# Patient Record
Sex: FEMALE | Race: WHITE | Employment: OTHER | URBAN - METROPOLITAN AREA
[De-identification: names, ages, dates, MRNs, and addresses within clinical notes are randomized per-mention and may not be internally consistent; named-entity substitution may affect disease eponyms.]

---

## 2020-02-06 ENCOUNTER — HOSPITAL ENCOUNTER (OUTPATIENT)
Dept: CT IMAGING | Age: 45
Discharge: HOME | End: 2020-02-08
Payer: COMMERCIAL

## 2020-02-06 ENCOUNTER — HOSPITAL ENCOUNTER (OUTPATIENT)
Dept: ULTRASOUND IMAGING | Age: 45
Discharge: HOME | End: 2020-02-08
Payer: COMMERCIAL

## 2020-02-06 VITALS
DIASTOLIC BLOOD PRESSURE: 93 MMHG | SYSTOLIC BLOOD PRESSURE: 141 MMHG | WEIGHT: 165 LBS | HEART RATE: 75 BPM | HEIGHT: 64 IN | BODY MASS INDEX: 28.17 KG/M2

## 2020-02-06 PROCEDURE — 6360000004 HC RX CONTRAST MEDICATION: Performed by: INTERNAL MEDICINE

## 2020-02-06 PROCEDURE — 71270 CT THORAX DX C-/C+: CPT

## 2020-02-06 PROCEDURE — 93978 VASCULAR STUDY: CPT

## 2020-02-06 RX ADMIN — IOPAMIDOL 100 ML: 755 INJECTION, SOLUTION INTRAVENOUS at 07:50

## 2021-01-28 ENCOUNTER — NURSE ONLY (OUTPATIENT)
Dept: PRIMARY CARE CLINIC | Age: 46
End: 2021-01-28

## 2021-01-28 DIAGNOSIS — Z01.818 ENCOUNTER FOR PREADMISSION TESTING: ICD-10-CM

## 2021-01-28 DIAGNOSIS — Z01.818 ENCOUNTER FOR PREADMISSION TESTING: Primary | ICD-10-CM

## 2021-01-29 LAB
SARS-COV-2: NOT DETECTED
SOURCE: NORMAL

## 2021-02-02 ENCOUNTER — ANESTHESIA EVENT (OUTPATIENT)
Dept: OPERATING ROOM | Age: 46
End: 2021-02-02
Payer: MEDICARE

## 2021-02-03 ENCOUNTER — ANESTHESIA (OUTPATIENT)
Dept: OPERATING ROOM | Age: 46
End: 2021-02-03
Payer: MEDICARE

## 2021-02-03 ENCOUNTER — HOSPITAL ENCOUNTER (OUTPATIENT)
Age: 46
Setting detail: OUTPATIENT SURGERY
Discharge: HOME | End: 2021-02-03
Attending: ORTHOPAEDIC SURGERY | Admitting: ORTHOPAEDIC SURGERY
Payer: MEDICARE

## 2021-02-03 VITALS
HEART RATE: 78 BPM | RESPIRATION RATE: 20 BRPM | DIASTOLIC BLOOD PRESSURE: 88 MMHG | TEMPERATURE: 97.9 F | BODY MASS INDEX: 27.31 KG/M2 | SYSTOLIC BLOOD PRESSURE: 137 MMHG | HEIGHT: 64 IN | OXYGEN SATURATION: 94 % | WEIGHT: 160 LBS

## 2021-02-03 VITALS — OXYGEN SATURATION: 97 % | SYSTOLIC BLOOD PRESSURE: 176 MMHG | DIASTOLIC BLOOD PRESSURE: 105 MMHG

## 2021-02-03 PROCEDURE — 3700000000 HC ANESTHESIA ATTENDED CARE: Performed by: ORTHOPAEDIC SURGERY

## 2021-02-03 PROCEDURE — 2580000003 HC RX 258: Performed by: ANESTHESIOLOGY

## 2021-02-03 PROCEDURE — 3600000012 HC SURGERY LEVEL 2 ADDTL 15MIN: Performed by: ORTHOPAEDIC SURGERY

## 2021-02-03 PROCEDURE — 2580000003 HC RX 258: Performed by: ORTHOPAEDIC SURGERY

## 2021-02-03 PROCEDURE — 2709999900 HC NON-CHARGEABLE SUPPLY: Performed by: ORTHOPAEDIC SURGERY

## 2021-02-03 PROCEDURE — 6360000002 HC RX W HCPCS: Performed by: ORTHOPAEDIC SURGERY

## 2021-02-03 PROCEDURE — 3700000001 HC ADD 15 MINUTES (ANESTHESIA): Performed by: ORTHOPAEDIC SURGERY

## 2021-02-03 PROCEDURE — 3600000002 HC SURGERY LEVEL 2 BASE: Performed by: ORTHOPAEDIC SURGERY

## 2021-02-03 PROCEDURE — 88304 TISSUE EXAM BY PATHOLOGIST: CPT

## 2021-02-03 RX ORDER — FENTANYL CITRATE 50 UG/ML
50 INJECTION, SOLUTION INTRAMUSCULAR; INTRAVENOUS EVERY 10 MIN PRN
Status: DISCONTINUED | OUTPATIENT
Start: 2021-02-03 | End: 2021-02-03 | Stop reason: HOSPADM

## 2021-02-03 RX ORDER — DULOXETIN HYDROCHLORIDE 30 MG/1
30 CAPSULE, DELAYED RELEASE ORAL 2 TIMES DAILY
COMMUNITY
Start: 2014-10-31

## 2021-02-03 RX ORDER — CEFAZOLIN SODIUM 2 G/50ML
2000 SOLUTION INTRAVENOUS
Status: COMPLETED | OUTPATIENT
Start: 2021-02-03 | End: 2021-02-03

## 2021-02-03 RX ORDER — MAGNESIUM OXIDE 400 MG/1
400 TABLET ORAL
COMMUNITY

## 2021-02-03 RX ORDER — SODIUM CHLORIDE 0.9 % (FLUSH) 0.9 %
10 SYRINGE (ML) INJECTION EVERY 12 HOURS SCHEDULED
Status: DISCONTINUED | OUTPATIENT
Start: 2021-02-03 | End: 2021-02-03 | Stop reason: HOSPADM

## 2021-02-03 RX ORDER — UBIDECARENONE 100 MG
CAPSULE ORAL
COMMUNITY

## 2021-02-03 RX ORDER — NADOLOL 20 MG/1
10 TABLET ORAL DAILY
COMMUNITY

## 2021-02-03 RX ORDER — LIDOCAINE HYDROCHLORIDE 10 MG/ML
1 INJECTION, SOLUTION EPIDURAL; INFILTRATION; INTRACAUDAL; PERINEURAL
Status: DISCONTINUED | OUTPATIENT
Start: 2021-02-03 | End: 2021-02-03 | Stop reason: HOSPADM

## 2021-02-03 RX ORDER — METOCLOPRAMIDE HYDROCHLORIDE 5 MG/ML
10 INJECTION INTRAMUSCULAR; INTRAVENOUS
Status: DISCONTINUED | OUTPATIENT
Start: 2021-02-03 | End: 2021-02-03 | Stop reason: HOSPADM

## 2021-02-03 RX ORDER — SODIUM CHLORIDE 0.9 % (FLUSH) 0.9 %
10 SYRINGE (ML) INJECTION PRN
Status: DISCONTINUED | OUTPATIENT
Start: 2021-02-03 | End: 2021-02-03 | Stop reason: HOSPADM

## 2021-02-03 RX ORDER — ONDANSETRON 2 MG/ML
4 INJECTION INTRAMUSCULAR; INTRAVENOUS
Status: DISCONTINUED | OUTPATIENT
Start: 2021-02-03 | End: 2021-02-03 | Stop reason: HOSPADM

## 2021-02-03 RX ORDER — MAGNESIUM HYDROXIDE 1200 MG/15ML
LIQUID ORAL CONTINUOUS PRN
Status: COMPLETED | OUTPATIENT
Start: 2021-02-03 | End: 2021-02-03

## 2021-02-03 RX ORDER — MEPERIDINE HYDROCHLORIDE 25 MG/ML
12.5 INJECTION INTRAMUSCULAR; INTRAVENOUS; SUBCUTANEOUS EVERY 5 MIN PRN
Status: DISCONTINUED | OUTPATIENT
Start: 2021-02-03 | End: 2021-02-03 | Stop reason: HOSPADM

## 2021-02-03 RX ORDER — FLUDROCORTISONE ACETATE 0.1 MG/1
TABLET ORAL
COMMUNITY
Start: 2020-06-03

## 2021-02-03 RX ORDER — SODIUM CHLORIDE, SODIUM LACTATE, POTASSIUM CHLORIDE, CALCIUM CHLORIDE 600; 310; 30; 20 MG/100ML; MG/100ML; MG/100ML; MG/100ML
INJECTION, SOLUTION INTRAVENOUS CONTINUOUS
Status: DISCONTINUED | OUTPATIENT
Start: 2021-02-03 | End: 2021-02-03 | Stop reason: HOSPADM

## 2021-02-03 RX ORDER — DIPHENHYDRAMINE HYDROCHLORIDE 50 MG/ML
12.5 INJECTION INTRAMUSCULAR; INTRAVENOUS
Status: DISCONTINUED | OUTPATIENT
Start: 2021-02-03 | End: 2021-02-03 | Stop reason: HOSPADM

## 2021-02-03 RX ADMIN — SODIUM CHLORIDE, POTASSIUM CHLORIDE, SODIUM LACTATE AND CALCIUM CHLORIDE: 600; 310; 30; 20 INJECTION, SOLUTION INTRAVENOUS at 07:46

## 2021-02-03 RX ADMIN — CEFAZOLIN SODIUM 2 G: 2 SOLUTION INTRAVENOUS at 08:00

## 2021-02-03 ASSESSMENT — PULMONARY FUNCTION TESTS
PIF_VALUE: 0

## 2021-02-03 NOTE — PROGRESS NOTES
Patient ID:  Anton Lackey  [id number]  54 y.o.  1975  BOVIE PAD SITE CLEAR AND INTACT PRE AND POST OP. TAKEN TO PACU,   ATTACHED TO MONITOR AND REPORT GIVEN TO RN.   VSS DRSG DRY AND INTACT  GLASSES IN LABELED BAG ON PATIENT CART  TOURNI-COT ON LEFT LONG FINGER AT [address]  TOURNI-COT OFF LEFT FINGER AT 8875        Electronically signed by Delmi Álvarez RN

## 2021-02-03 NOTE — ANESTHESIA POSTPROCEDURE EVALUATION
Department of Anesthesiology  Postprocedure Note    Patient: Kadie Irby  MRN: [id number]  YOB: 1975  Date of evaluation: 2/3/2021  Time:  8:32 AM     Procedure Summary     Date: 02/03/21 Room / Location: [address]    Anesthesia Start: 0800 Anesthesia Stop:     Procedure: LEFT REMOVAL LEFT LONG FINGER MUCOUS CYST (Left Fingers) Diagnosis: (LEFT LONG FINGER MUCOUS CYST)    Surgeons: Kev Lo DO Responsible Provider: Brittaney Wheat MD    Anesthesia Type: MAC ASA Status: 2          Anesthesia Type: MAC    Jaylen Phase I:      Jaylen Phase II:      Last vitals: Reviewed and per EMR flowsheets.        Anesthesia Post Evaluation    Patient location during evaluation: bedside  Patient participation: complete - patient participated  Level of consciousness: awake and alert  Pain score: 0  Nausea & Vomiting: no vomiting and no nausea  Complications: no  Cardiovascular status: hemodynamically stable  Respiratory status: acceptable and room air  Hydration status: stable

## 2021-02-03 NOTE — OP NOTE
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  Left long finger removal bone spurring dorsal aspect of middle phalanx at distal interphalangeal joint. Procedure performed: Same    Surgeon: Oliva Shah D.O. Assistant: None    Anesthesia: Digital block monitored by the anesthesia team    Estimated blood loss: Less than 5 cc    Drains: None    Tourniquet: Turnicot for approximately 10 minutes    Specimens: The excised mass was sent for pathology. To inspection it was consistent with involuted mucous cyst.    Implants: None    Indications: The patient presented to the office with history of left long finger mass with spontaneous rupture. She described the expression of a thick gelatinous material with repeated ruptures. Treatment options were discussed. Recommendations were made for excision of the mass with debridement of distal interphalangeal joint osteoarthritis bone spurring. After full discussion regarding risks benefits and alternatives the patient elected to proceed forth with surgery. Informed consent was signed and placed in the chart. Complications: None noted at the time of surgery     Description of operation: The patient was taken to the operative suite and placed in the supine position on the operating table. A timeout was performed and the left long finger was confirmed to be the operative site. She was carefully positioned on the table in such a fashion as to pad all bony prominences and peripheral nerves. She was administered appropriate IV antibiotics. The Marcaine digital block was working well. She was prepped and draped in the normal sterile fashion.   A Y-shaped incision was marked over the dorsal aspect of the long finger with the longitudinal limb of the why oriented transversely over the distal interphalangeal joint. A turnicot was placed. The 15 blade was used to incise skin reflecting full-thickness subcutaneous flaps off of the extensor mechanism. The interface between the mass itself and the dermal plane was incised and the mass circumferentially dissected with the 15 blade. The mass was excised along with the dorsal radial aspect of the capsule taking care to avoid iatrogenic injury to the terminal extensor mechanism. This exposed hypertrophic bone spurring in the joint itself coming off of the dorsal radial aspect of the middle phalanx. The mass excised was sent for pathology. The rongeur was used to remove the hypertrophic bone spurring which likely gave rise to the mucous cyst.  Irrigation was performed. The skin flaps were contoured with the 15 blade and interrupted nylon stitches used to close the skin. The turnicot was relieved, hemostasis confirmed, and a bulky soft dressing placed. The patient was allowed to head to recovery in stable condition. Overall she tolerated the procedure well.      Disposition: Stable to PACU          Specimens:   ID Type Source Tests Collected by Time Destination   A : LEFT LONG FINGER MUCOUS CYST Tissue Finger SURGICAL PATHOLOGY Madison San DO 2/3/2021 8505          Electronically signed by Blayne Chicas DO on 2/3/2021 at 9:03 AM

## 2022-12-08 LAB
ANION GAP SERPL CALCULATED.3IONS-SCNC: 13 MMOL/L (ref 10–20)
BICARBONATE: 26 MMOL/L (ref 21–32)
CALCIUM SERPL-MCNC: 9.4 MG/DL (ref 8.6–10.3)
CHLORIDE BLD-SCNC: 108 MMOL/L (ref 98–107)
CREAT SERPL-MCNC: 0.73 MG/DL (ref 0.5–1)
EGFR FEMALE: >90 ML/MIN/1.73M2
GLUCOSE: 123 MG/DL (ref 74–99)
MAGNESIUM: 2 MG/DL (ref 1.6–2.4)
POTASSIUM SERPL-SCNC: 3.9 MMOL/L (ref 3.5–5.3)
SODIUM BLD-SCNC: 143 MMOL/L (ref 136–145)
UREA NITROGEN: 16 MG/DL (ref 6–23)

## 2023-06-26 LAB
ALANINE AMINOTRANSFERASE (SGPT) (U/L) IN SER/PLAS: 13 U/L (ref 7–45)
ALBUMIN (G/DL) IN SER/PLAS: 4.8 G/DL (ref 3.4–5)
ALKALINE PHOSPHATASE (U/L) IN SER/PLAS: 36 U/L (ref 33–110)
ANION GAP IN SER/PLAS: 12 MMOL/L (ref 10–20)
ASPARTATE AMINOTRANSFERASE (SGOT) (U/L) IN SER/PLAS: 22 U/L (ref 9–39)
BASOPHILS (10*3/UL) IN BLOOD BY AUTOMATED COUNT: 0.05 X10E9/L (ref 0–0.1)
BASOPHILS/100 LEUKOCYTES IN BLOOD BY AUTOMATED COUNT: 0.7 % (ref 0–2)
BILIRUBIN TOTAL (MG/DL) IN SER/PLAS: 0.6 MG/DL (ref 0–1.2)
C REACTIVE PROTEIN (MG/L) IN SER/PLAS: 0.2 MG/DL
CALCIUM (MG/DL) IN SER/PLAS: 9.8 MG/DL (ref 8.6–10.3)
CARBON DIOXIDE, TOTAL (MMOL/L) IN SER/PLAS: 24 MMOL/L (ref 21–32)
CHLORIDE (MMOL/L) IN SER/PLAS: 104 MMOL/L (ref 98–107)
CREATININE (MG/DL) IN SER/PLAS: 0.74 MG/DL (ref 0.5–1.05)
EOSINOPHILS (10*3/UL) IN BLOOD BY AUTOMATED COUNT: 0.13 X10E9/L (ref 0–0.7)
EOSINOPHILS/100 LEUKOCYTES IN BLOOD BY AUTOMATED COUNT: 1.8 % (ref 0–6)
ERYTHROCYTE DISTRIBUTION WIDTH (RATIO) BY AUTOMATED COUNT: 14 % (ref 11.5–14.5)
ERYTHROCYTE MEAN CORPUSCULAR HEMOGLOBIN CONCENTRATION (G/DL) BY AUTOMATED: 33.9 G/DL (ref 32–36)
ERYTHROCYTE MEAN CORPUSCULAR VOLUME (FL) BY AUTOMATED COUNT: 87 FL (ref 80–100)
ERYTHROCYTES (10*6/UL) IN BLOOD BY AUTOMATED COUNT: 4.26 X10E12/L (ref 4–5.2)
GFR FEMALE: >90 ML/MIN/1.73M2
GLUCOSE (MG/DL) IN SER/PLAS: 85 MG/DL (ref 74–99)
HEMATOCRIT (%) IN BLOOD BY AUTOMATED COUNT: 37.2 % (ref 36–46)
HEMOGLOBIN (G/DL) IN BLOOD: 12.6 G/DL (ref 12–16)
IMMATURE GRANULOCYTES/100 LEUKOCYTES IN BLOOD BY AUTOMATED COUNT: 0.5 % (ref 0–0.9)
LEUKOCYTES (10*3/UL) IN BLOOD BY AUTOMATED COUNT: 7.3 X10E9/L (ref 4.4–11.3)
LYMPHOCYTES (10*3/UL) IN BLOOD BY AUTOMATED COUNT: 2.38 X10E9/L (ref 1.2–4.8)
LYMPHOCYTES/100 LEUKOCYTES IN BLOOD BY AUTOMATED COUNT: 32.5 % (ref 13–44)
MONOCYTES (10*3/UL) IN BLOOD BY AUTOMATED COUNT: 0.61 X10E9/L (ref 0.1–1)
MONOCYTES/100 LEUKOCYTES IN BLOOD BY AUTOMATED COUNT: 8.3 % (ref 2–10)
NEUTROPHILS (10*3/UL) IN BLOOD BY AUTOMATED COUNT: 4.11 X10E9/L (ref 1.2–7.7)
NEUTROPHILS/100 LEUKOCYTES IN BLOOD BY AUTOMATED COUNT: 56.2 % (ref 40–80)
PLATELETS (10*3/UL) IN BLOOD AUTOMATED COUNT: 305 X10E9/L (ref 150–450)
POTASSIUM (MMOL/L) IN SER/PLAS: 4.2 MMOL/L (ref 3.5–5.3)
PROTEIN TOTAL: 7.1 G/DL (ref 6.4–8.2)
SODIUM (MMOL/L) IN SER/PLAS: 136 MMOL/L (ref 136–145)
UREA NITROGEN (MG/DL) IN SER/PLAS: 15 MG/DL (ref 6–23)

## 2023-08-25 LAB
ALANINE AMINOTRANSFERASE (SGPT) (U/L) IN SER/PLAS: 11 U/L (ref 7–45)
ALBUMIN (G/DL) IN SER/PLAS: 4.7 G/DL (ref 3.4–5)
ALKALINE PHOSPHATASE (U/L) IN SER/PLAS: 41 U/L (ref 33–110)
ANION GAP IN SER/PLAS: 12 MMOL/L (ref 10–20)
ASPARTATE AMINOTRANSFERASE (SGOT) (U/L) IN SER/PLAS: 21 U/L (ref 9–39)
BASOPHILS (10*3/UL) IN BLOOD BY AUTOMATED COUNT: 0.09 X10E9/L (ref 0–0.1)
BASOPHILS/100 LEUKOCYTES IN BLOOD BY AUTOMATED COUNT: 0.9 % (ref 0–2)
BILIRUBIN TOTAL (MG/DL) IN SER/PLAS: 0.6 MG/DL (ref 0–1.2)
C REACTIVE PROTEIN (MG/L) IN SER/PLAS: 0.24 MG/DL
CALCIUM (MG/DL) IN SER/PLAS: 9.6 MG/DL (ref 8.6–10.3)
CARBON DIOXIDE, TOTAL (MMOL/L) IN SER/PLAS: 27 MMOL/L (ref 21–32)
CHLORIDE (MMOL/L) IN SER/PLAS: 103 MMOL/L (ref 98–107)
CREATININE (MG/DL) IN SER/PLAS: 0.74 MG/DL (ref 0.5–1.05)
EOSINOPHILS (10*3/UL) IN BLOOD BY AUTOMATED COUNT: 0.17 X10E9/L (ref 0–0.7)
EOSINOPHILS/100 LEUKOCYTES IN BLOOD BY AUTOMATED COUNT: 1.7 % (ref 0–6)
ERYTHROCYTE DISTRIBUTION WIDTH (RATIO) BY AUTOMATED COUNT: 14 % (ref 11.5–14.5)
ERYTHROCYTE MEAN CORPUSCULAR HEMOGLOBIN CONCENTRATION (G/DL) BY AUTOMATED: 32.8 G/DL (ref 32–36)
ERYTHROCYTE MEAN CORPUSCULAR VOLUME (FL) BY AUTOMATED COUNT: 91 FL (ref 80–100)
ERYTHROCYTES (10*6/UL) IN BLOOD BY AUTOMATED COUNT: 4.26 X10E12/L (ref 4–5.2)
GFR FEMALE: >90 ML/MIN/1.73M2
GLUCOSE (MG/DL) IN SER/PLAS: 81 MG/DL (ref 74–99)
HEMATOCRIT (%) IN BLOOD BY AUTOMATED COUNT: 38.7 % (ref 36–46)
HEMOGLOBIN (G/DL) IN BLOOD: 12.7 G/DL (ref 12–16)
IMMATURE GRANULOCYTES/100 LEUKOCYTES IN BLOOD BY AUTOMATED COUNT: 0.3 % (ref 0–0.9)
LEUKOCYTES (10*3/UL) IN BLOOD BY AUTOMATED COUNT: 10.2 X10E9/L (ref 4.4–11.3)
LYMPHOCYTES (10*3/UL) IN BLOOD BY AUTOMATED COUNT: 3.2 X10E9/L (ref 1.2–4.8)
LYMPHOCYTES/100 LEUKOCYTES IN BLOOD BY AUTOMATED COUNT: 31.3 % (ref 13–44)
MONOCYTES (10*3/UL) IN BLOOD BY AUTOMATED COUNT: 0.69 X10E9/L (ref 0.1–1)
MONOCYTES/100 LEUKOCYTES IN BLOOD BY AUTOMATED COUNT: 6.7 % (ref 2–10)
NEUTROPHILS (10*3/UL) IN BLOOD BY AUTOMATED COUNT: 6.06 X10E9/L (ref 1.2–7.7)
NEUTROPHILS/100 LEUKOCYTES IN BLOOD BY AUTOMATED COUNT: 59.1 % (ref 40–80)
PLATELETS (10*3/UL) IN BLOOD AUTOMATED COUNT: 313 X10E9/L (ref 150–450)
POTASSIUM (MMOL/L) IN SER/PLAS: 4 MMOL/L (ref 3.5–5.3)
PROTEIN TOTAL: 7 G/DL (ref 6.4–8.2)
SODIUM (MMOL/L) IN SER/PLAS: 138 MMOL/L (ref 136–145)
UREA NITROGEN (MG/DL) IN SER/PLAS: 14 MG/DL (ref 6–23)

## 2023-10-10 ENCOUNTER — ANCILLARY PROCEDURE (OUTPATIENT)
Dept: RADIOLOGY | Facility: CLINIC | Age: 48
End: 2023-10-10
Payer: MEDICARE

## 2023-10-10 DIAGNOSIS — M25.511 PAIN IN RIGHT SHOULDER: ICD-10-CM

## 2023-10-10 PROCEDURE — 73200 CT UPPER EXTREMITY W/O DYE: CPT | Mod: RT

## 2023-10-10 PROCEDURE — 73200 CT UPPER EXTREMITY W/O DYE: CPT | Mod: RIGHT SIDE | Performed by: RADIOLOGY

## 2023-10-30 ENCOUNTER — TELEPHONE (OUTPATIENT)
Dept: CARDIOLOGY | Facility: CLINIC | Age: 48
End: 2023-10-30
Payer: MEDICARE

## 2023-10-30 NOTE — TELEPHONE ENCOUNTER
Pt calls in states that she is having a shoulder surgery scheduled for the end of November. Pt will need a cardiac clearance. Pt will be establishing with Dr. Gael Cali MD, [facility] in December pt is a former [facility] pt. Mindy

## 2023-10-31 PROBLEM — G90.9 AUTONOMIC NERVOUS SYSTEM DISORDER: Status: ACTIVE | Noted: 2023-10-31

## 2023-10-31 PROBLEM — I71.20 ANEURYSM OF THORACIC AORTA (CMS-HCC): Status: ACTIVE | Noted: 2023-10-31

## 2023-10-31 PROBLEM — M25.50 ARTHRALGIA OF MULTIPLE JOINTS: Status: ACTIVE | Noted: 2023-10-31

## 2023-10-31 PROBLEM — M79.7 FIBROMYALGIA: Status: ACTIVE | Noted: 2020-07-15

## 2023-10-31 PROBLEM — Q21.12 PFO (PATENT FORAMEN OVALE) (HHS-HCC): Status: ACTIVE | Noted: 2023-10-31

## 2023-10-31 PROBLEM — R53.83 FATIGUE: Status: ACTIVE | Noted: 2023-10-31

## 2023-10-31 PROBLEM — E66.9 CLASS 1 OBESITY WITH BODY MASS INDEX (BMI) OF 30.0 TO 30.9 IN ADULT: Status: ACTIVE | Noted: 2023-10-31

## 2023-10-31 PROBLEM — R20.0 NUMBNESS: Status: ACTIVE | Noted: 2023-10-31

## 2023-10-31 PROBLEM — R74.01 TRANSAMINITIS: Status: ACTIVE | Noted: 2022-05-12

## 2023-10-31 PROBLEM — G60.9 IDIOPATHIC PERIPHERAL NEUROPATHY: Status: ACTIVE | Noted: 2023-10-31

## 2023-10-31 PROBLEM — G89.18 POST-OP PAIN: Status: ACTIVE | Noted: 2023-10-31

## 2023-10-31 PROBLEM — R20.2 TINGLING: Status: ACTIVE | Noted: 2023-10-31

## 2023-10-31 PROBLEM — R00.0 TACHYCARDIA: Status: ACTIVE | Noted: 2023-10-31

## 2023-10-31 PROBLEM — R61 NIGHT SWEATS: Status: ACTIVE | Noted: 2023-10-31

## 2023-10-31 PROBLEM — I49.9 IRREGULAR HEARTBEAT: Status: ACTIVE | Noted: 2023-10-31

## 2023-10-31 PROBLEM — E55.9 VITAMIN D DEFICIENCY: Status: ACTIVE | Noted: 2023-10-31

## 2023-10-31 PROBLEM — I25.3 ATRIAL SEPTAL ANEURYSM: Status: ACTIVE | Noted: 2020-07-15

## 2023-10-31 PROBLEM — E66.811 CLASS 1 OBESITY WITH BODY MASS INDEX (BMI) OF 30.0 TO 30.9 IN ADULT: Status: ACTIVE | Noted: 2023-10-31

## 2023-10-31 PROBLEM — S96.919A: Status: ACTIVE | Noted: 2022-02-17

## 2023-10-31 PROBLEM — M21.6X2 EQUINUS DEFORMITY OF BOTH FEET: Status: ACTIVE | Noted: 2022-04-05

## 2023-10-31 PROBLEM — F41.9 ANXIETY AND DEPRESSION: Status: ACTIVE | Noted: 2023-10-31

## 2023-10-31 PROBLEM — G47.00 INSOMNIA: Status: ACTIVE | Noted: 2023-10-31

## 2023-10-31 PROBLEM — M21.6X1 EQUINUS DEFORMITY OF BOTH FEET: Status: ACTIVE | Noted: 2022-04-05

## 2023-10-31 PROBLEM — F32.A ANXIETY AND DEPRESSION: Status: ACTIVE | Noted: 2023-10-31

## 2023-10-31 PROBLEM — E78.2 HYPERLIPIDEMIA, MIXED: Status: ACTIVE | Noted: 2022-05-12

## 2023-10-31 PROBLEM — M54.12 CERVICAL RADICULOPATHY: Status: ACTIVE | Noted: 2023-10-31

## 2023-10-31 RX ORDER — NADOLOL 20 MG/1
10 TABLET ORAL DAILY
COMMUNITY
End: 2023-11-01 | Stop reason: WASHOUT

## 2023-10-31 RX ORDER — DULOXETIN HYDROCHLORIDE 30 MG/1
30 CAPSULE, DELAYED RELEASE ORAL DAILY
COMMUNITY

## 2023-10-31 RX ORDER — NIACIN (INOSITOL NIACINATE) 400(500MG)
CAPSULE ORAL
COMMUNITY
End: 2023-11-01 | Stop reason: WASHOUT

## 2023-10-31 RX ORDER — MAGNESIUM 250 MG
500 TABLET ORAL DAILY
COMMUNITY
End: 2023-11-01 | Stop reason: WASHOUT

## 2023-10-31 RX ORDER — LANOLIN ALCOHOL/MO/W.PET/CERES
400 CREAM (GRAM) TOPICAL
COMMUNITY
End: 2023-11-01 | Stop reason: WASHOUT

## 2023-10-31 RX ORDER — CYCLOBENZAPRINE HCL 5 MG
5 TABLET ORAL NIGHTLY
COMMUNITY
End: 2023-11-01 | Stop reason: WASHOUT

## 2023-10-31 RX ORDER — FLUDROCORTISONE ACETATE 0.1 MG/1
0.1 TABLET ORAL 2 TIMES WEEKLY
COMMUNITY
Start: 2020-06-03 | End: 2023-11-01 | Stop reason: WASHOUT

## 2023-10-31 RX ORDER — PNV NO.95/FERROUS FUM/FOLIC AC 28MG-0.8MG
TABLET ORAL
COMMUNITY
End: 2023-11-01 | Stop reason: WASHOUT

## 2023-10-31 RX ORDER — POTASSIUM CHLORIDE 1500 MG/1
40 TABLET, FILM COATED, EXTENDED RELEASE ORAL DAILY
COMMUNITY
Start: 2020-06-03 | End: 2023-11-01 | Stop reason: WASHOUT

## 2023-10-31 RX ORDER — PROPRANOLOL HYDROCHLORIDE 10 MG/1
10 TABLET ORAL 2 TIMES DAILY
COMMUNITY
End: 2023-12-12 | Stop reason: SDUPTHER

## 2023-10-31 RX ORDER — ONDANSETRON 4 MG/1
4 TABLET, FILM COATED ORAL
COMMUNITY
End: 2023-11-01 | Stop reason: WASHOUT

## 2023-10-31 RX ORDER — TIZANIDINE 2 MG/1
1 TABLET ORAL 2 TIMES DAILY PRN
COMMUNITY
Start: 2020-07-15 | End: 2023-11-01 | Stop reason: WASHOUT

## 2023-10-31 RX ORDER — NEEDLES, SAFETY 22GX1 1/2"
NEEDLE, DISPOSABLE MISCELLANEOUS
COMMUNITY
Start: 2023-10-17 | End: 2023-11-01 | Stop reason: WASHOUT

## 2023-10-31 RX ORDER — ONDANSETRON 4 MG/1
4 TABLET, ORALLY DISINTEGRATING ORAL EVERY 8 HOURS PRN
COMMUNITY
Start: 2022-12-26 | End: 2023-11-01 | Stop reason: WASHOUT

## 2023-10-31 RX ORDER — IBUPROFEN 100 MG/5ML
1000 SUSPENSION, ORAL (FINAL DOSE FORM) ORAL
COMMUNITY
End: 2023-11-01 | Stop reason: WASHOUT

## 2023-10-31 RX ORDER — CHLORZOXAZONE 500 MG/1
TABLET ORAL
COMMUNITY
Start: 2020-06-03 | End: 2023-11-01 | Stop reason: WASHOUT

## 2023-10-31 RX ORDER — METHOTREXATE 25 MG/ML
INJECTION INTRA-ARTERIAL; INTRAMUSCULAR; INTRATHECAL; INTRAVENOUS
COMMUNITY
Start: 2023-10-30 | End: 2023-12-12

## 2023-10-31 RX ORDER — ACETAMINOPHEN 500 MG
1 TABLET ORAL DAILY
COMMUNITY

## 2023-10-31 RX ORDER — IBUPROFEN 100 MG/5ML
3 SUSPENSION, ORAL (FINAL DOSE FORM) ORAL 3 TIMES DAILY
COMMUNITY

## 2023-10-31 RX ORDER — DULOXETIN HYDROCHLORIDE 60 MG/1
2 CAPSULE, DELAYED RELEASE ORAL NIGHTLY
COMMUNITY
End: 2023-11-01 | Stop reason: WASHOUT

## 2023-10-31 RX ORDER — VITAMIN E (DL,TOCOPHERYL ACET) 90 MG
CAPSULE ORAL
COMMUNITY
End: 2023-12-12

## 2023-11-01 ENCOUNTER — OFFICE VISIT (OUTPATIENT)
Dept: CARDIOLOGY | Facility: CLINIC | Age: 48
End: 2023-11-01
Payer: MEDICARE

## 2023-11-01 VITALS
HEART RATE: 61 BPM | BODY MASS INDEX: 29.19 KG/M2 | WEIGHT: 171 LBS | SYSTOLIC BLOOD PRESSURE: 136 MMHG | DIASTOLIC BLOOD PRESSURE: 84 MMHG | HEIGHT: 64 IN

## 2023-11-01 DIAGNOSIS — R55 SYNCOPE AND COLLAPSE: ICD-10-CM

## 2023-11-01 DIAGNOSIS — E78.2 HYPERLIPIDEMIA, MIXED: ICD-10-CM

## 2023-11-01 DIAGNOSIS — I25.3 ATRIAL SEPTAL ANEURYSM: ICD-10-CM

## 2023-11-01 DIAGNOSIS — R00.0 TACHYCARDIA: Primary | ICD-10-CM

## 2023-11-01 DIAGNOSIS — Q21.12 PFO (PATENT FORAMEN OVALE) (HHS-HCC): ICD-10-CM

## 2023-11-01 DIAGNOSIS — G90.A POSTURAL ORTHOSTATIC TACHYCARDIA SYNDROME: ICD-10-CM

## 2023-11-01 DIAGNOSIS — Q79.62 EHLERS-DANLOS, HYPERMOBILE TYPE (HHS-HCC): ICD-10-CM

## 2023-11-01 PROCEDURE — 1036F TOBACCO NON-USER: CPT | Performed by: NURSE PRACTITIONER

## 2023-11-01 PROCEDURE — 99214 OFFICE O/P EST MOD 30 MIN: CPT | Performed by: NURSE PRACTITIONER

## 2023-11-01 PROCEDURE — 93000 ELECTROCARDIOGRAM COMPLETE: CPT | Performed by: NURSE PRACTITIONER

## 2023-11-01 NOTE — PROGRESS NOTES
Demi Cao is a 47 y.o. female that presents to the office today for pre-operative cardiac evaluation.   She has  followed with Dr. Haddad for primary cardiology.  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  She follows with rheumatology on a regular basis.  calcium score 0, trivial PFO, intra-atrial aneurysm for which intervention was not required.     She presents to the office today for preoperative evaluation for right shoulder arthroplasty for bicep Tenodesis with Dr. Hudson 11/165/23 as an outpatient surgery. From a cardiac standpoint she states that she is doing well,  she denies chest pain, chest pressure, chest tightness, shortness of breath, palpitations.  She does admit to occasional lightheadedness and dizziness.  She states that she continues to have sublux of her right shoulder and fingers.  She continues to be independent and performs her own ADLs.      Testing Reviewed  EKG obtained in the office today and verified with Dr. Cali showed sinus rhythm HR 61 bpm.    New surgery along left past medical history, medications and assessment were discussed with Dr. Cali who agrees from a cardiovascular standpoint she may proceed with orthopedic surgery.    Assessment/Plan  1.  She is at moderate risk for cardiovascular events during surgical procedure, but at acceptable risk to proceed with surgery.    2.  Follow-up with Dr. Cali in the office as previously scheduled or sooner if needed.      Patient Active Problem List   Diagnosis    Aneurysm of thoracic aorta (CMS/HCC)    Anxiety and depression    Arthralgia of multiple joints    Atrial septal aneurysm    Autonomic nervous system disorder    Cervical radiculopathy    Dane-Danlos, hypermobile type    Dane-Danlos syndrome    Equinus deformity of both feet    Fatigue    Chronic pain     Fibromyalgia    Hyperlipidemia, mixed    Idiopathic peripheral neuropathy    Insomnia    Irregular heartbeat    Night sweats    Numbness    PFO (patent foramen ovale)    Post-op pain    Postural orthostatic tachycardia syndrome    Sleep disorder    Strain of ankle and foot, initial encounter    Syncope and collapse    Tachycardia    Tingling    TMJ (temporomandibular joint disorder)    Transaminitis    Vitamin D deficiency    Class 1 obesity with body mass index (BMI) of 30.0 to 30.9 in adult       Social History     Tobacco Use    Smoking status: Former     Types: Cigarettes    Smokeless tobacco: Never   Substance Use Topics    Alcohol use: Yes    Drug use: Never       Past Medical History:   Diagnosis Date    Aneurysm of heart 12/15/2022    Atrial septal aneurysm    Dane-Danlos syndrome, unspecified 04/07/2014    Dane-Danlos syndrome    Long term (current) use of systemic steroids 06/27/2019    Long term (current) use of systemic steroids    Myalgia, other site 06/27/2019    Myofascial pain syndrome    Other long term (current) drug therapy 06/27/2019    High risk medication use    Pain in right toe(s) 06/27/2019    Pain and swelling of toe of right foot    Personal history of other diseases of the female genital tract     History of abnormal cervical Papanicolaou smear    Personal history of other diseases of the musculoskeletal system and connective tissue 06/27/2019    History of low back pain    Personal history of other diseases of the musculoskeletal system and connective tissue 06/27/2019    History of joint pain    Personal history of other diseases of the nervous system and sense organs     History of peripheral neuropathy    Personal history of other specified conditions 09/19/2017    History of facial pain         Current Outpatient Medications:     ascorbic acid (Vitamin C) 1,000 mg tablet, Take 3 tablets (3,000 mg) by mouth 3 times a day., Disp: , Rfl:     cholecalciferol (Vitamin D-3) 5,000 Units  tablet, Take by mouth once daily., Disp: , Rfl:     coenzyme Q10 400 mg capsule, Take by mouth., Disp: , Rfl:     DULoxetine (Cymbalta) 30 mg DR capsule, Take 1 capsule (30 mg) by mouth once daily., Disp: , Rfl:     magnesium glycinate 100 mg tablet, Take 500 mg by mouth once daily at bedtime., Disp: , Rfl:     methotrexate 25 mg/mL, WEEKLY, Disp: , Rfl:     propranolol (Inderal) 10 mg tablet, Take 1 tablet (10 mg) by mouth 2 times a day., Disp: , Rfl:     vit B12/intrinsic fact/folate (INTRINSI B12-FOLATE ORAL), Take 1 tablet by mouth once daily., Disp: , Rfl:     Ciprofloxacin, Codeine, Cyclobenzaprine, Gabapentin, Other, Sulfa (sulfonamide antibiotics), and Latex    Family History   Problem Relation Name Age of Onset    Hypertension Mother      Breast cancer Mother      Other (arteriosclerotic cardiovascular disease) Mother      Chiari malformation Father      Other (sinus tachycardia) Father      Lupus Other fam hx     Other (postural orthostatic tachycardia syndrome) Other fam hx        Past Surgical History:   Procedure Laterality Date    HYSTERECTOMY  08/21/2013    Hysterectomy    OTHER SURGICAL HISTORY  08/21/2013    Biopsy Of Liver    OTHER SURGICAL HISTORY  02/22/2022    Skin biopsy    OTHER SURGICAL HISTORY  06/27/2019    Tonsillectomy    OTHER SURGICAL HISTORY  06/27/2019    Uterine nerve ablation    OTHER SURGICAL HISTORY  01/13/2022    Cyst excision    OTHER SURGICAL HISTORY  01/13/2022    Tonsillectomy with adenoidectomy    OTHER SURGICAL HISTORY  01/13/2022    Shoulder surgery          Review of systems  Constitutional: No weight loss, fever, chills, weakness or fatigue  HEENT: No visual loss, blurred vision, double vision or yellow sclerae  Skin: No rash or itching  Cardiovascular: No chest pain, pressure or discomfort, No palpitations or edema.  Respiratory: No shortness of breath, cough or sputum  Gastrointestinal: No nausea, vomiting or diarrhea. No bloody or dark tarry stools.  Neurological: No  "headache, lightheadedness, dizziness, syncope. No numbness or tingling in the extremities. No change in mood, affect, memory, metation.   Musculoskeletal: No muscle, back pain, joint pain or stiffness.  Hematologic: No anemia, bleeding or bruising.    /84 (BP Location: Right arm, Patient Position: Sitting)   Pulse 61   Ht 1.626 m (5' 4\")   Wt 77.6 kg (171 lb)   BMI 29.35 kg/m²     Patient Active Problem List   Diagnosis    Aneurysm of thoracic aorta (CMS/HCC)    Anxiety and depression    Arthralgia of multiple joints    Atrial septal aneurysm    Autonomic nervous system disorder    Cervical radiculopathy    Dane-Danlos, hypermobile type    Dane-Danlos syndrome    Equinus deformity of both feet    Fatigue    Chronic pain    Fibromyalgia    Hyperlipidemia, mixed    Idiopathic peripheral neuropathy    Insomnia    Irregular heartbeat    Night sweats    Numbness    PFO (patent foramen ovale)    Post-op pain    Postural orthostatic tachycardia syndrome    Sleep disorder    Strain of ankle and foot, initial encounter    Syncope and collapse    Tachycardia    Tingling    TMJ (temporomandibular joint disorder)    Transaminitis    Vitamin D deficiency    Class 1 obesity with body mass index (BMI) of 30.0 to 30.9 in adult       Physical Exam  Constitutional: Well developed, awake/alert x 3, no distress.  Head/Neck: No JVD, No bruits  Respiratory/Thorax: patent airways, CTAB, normal breath sounds with good expansion.  Cardiovascular: Regular rate and rhythm, no murmurs, normal S1 and S2,   Gastrointestinal: Non distended, soft, non-tender, no rebound tenderness or guarding.  Extremities: No cyanosis, edema.  2 + equal pulses of the extremities.   Neurological: Alert and oriented x 3. Moves extremities spontaneous with purpose.  Psychological: Appropriate mood and behavior  Skin: Warm and Dry. No lesions or rashes.         Please excuse any errors in grammar or translation related to dictation, voice recognition " software was used to prepare this document.

## 2023-12-12 ENCOUNTER — OFFICE VISIT (OUTPATIENT)
Dept: CARDIOLOGY | Facility: CLINIC | Age: 48
End: 2023-12-12
Payer: MEDICARE

## 2023-12-12 VITALS
DIASTOLIC BLOOD PRESSURE: 104 MMHG | BODY MASS INDEX: 29.47 KG/M2 | WEIGHT: 172.6 LBS | SYSTOLIC BLOOD PRESSURE: 152 MMHG | HEART RATE: 68 BPM | HEIGHT: 64 IN

## 2023-12-12 DIAGNOSIS — R00.2 PALPITATIONS: ICD-10-CM

## 2023-12-12 DIAGNOSIS — G90.A POSTURAL ORTHOSTATIC TACHYCARDIA SYNDROME: Primary | ICD-10-CM

## 2023-12-12 DIAGNOSIS — I10 PRIMARY HYPERTENSION: ICD-10-CM

## 2023-12-12 DIAGNOSIS — Q79.62 EHLERS-DANLOS, HYPERMOBILE TYPE (HHS-HCC): ICD-10-CM

## 2023-12-12 PROCEDURE — 3080F DIAST BP >= 90 MM HG: CPT | Performed by: INTERNAL MEDICINE

## 2023-12-12 PROCEDURE — 1036F TOBACCO NON-USER: CPT | Performed by: INTERNAL MEDICINE

## 2023-12-12 PROCEDURE — 99214 OFFICE O/P EST MOD 30 MIN: CPT | Performed by: INTERNAL MEDICINE

## 2023-12-12 PROCEDURE — 3077F SYST BP >= 140 MM HG: CPT | Performed by: INTERNAL MEDICINE

## 2023-12-12 RX ORDER — PROPRANOLOL HYDROCHLORIDE 10 MG/1
10 TABLET ORAL 2 TIMES DAILY
Qty: 180 TABLET | Refills: 3 | Status: SHIPPED | OUTPATIENT
Start: 2023-12-12

## 2023-12-12 NOTE — PROGRESS NOTES
Patient:  Demi Cao  YOB: 1975  MRN: [id number]       Impression/Plan:     Postural orthostatic tachycardia syndrome  Primary hypertension  Palpitations  -    It may be that she had a steady state of Inderal she would feel better.  -    She seems very sensitive to this low-dose I think if she had a steady state she would not have the fluctuations of intermittently very rapid heart rates.  She is encouraged to maintain hydration and is usually she does so.  -    Her hypertension today is significant she has had some stressors.  Usually at home blood pressure 120-130 she is a former critical care nurse and is very adept at taking her own blood pressure.  At this point would avoid aggressive antihypertensives given her tendency to orthostatic tachycardia as well as occasional hypotension.    Dane-Danlos, hypermobile type  -     She meets criteria for the hypermobile type of Dane-Danlos.  -     Some individuals with this abnormality can have pathology of the mitral valve and aortic she has neither on relatively recent imaging and echocardiography.  -      Although POTS is not a diagnostic criteria for this entity it is clearly often associated.      Chief Complaint/Active Symptoms:       Demi Cao is a 48 y.o. female who presents with hypermobile EDS ( no test allows confirmation usual symptoms joint hypermobility, easily dislocatable joints, tiredness, constipation, POTS, urinary incontinence, she has many of these) confirmed by genetic evaluation in [facility].  She has significant POTS.  She has no history of coronary disease with calcium score of 0.  She is known to have a trivial PFO and interatrial aneurysm.  Echo showed no significant valvular heart disease.  She has had no evidence of aortic pathology chest CT with contrast February 2020 normal aorta.  Echocardiogram 12/8/2022 normal LV/RV/RVSP.  Tiny PFO.  Normal valve structure and function no evidence  aortic root dilatation or MVP.  She is a former critical care nurse    Was seen in this office nurse practitioner Rachana Ramirez 11/1/2023 no cardiovascular symptoms.  Was in sinus rhythm heart rate 61 at that time.  Was being considered for shoulder surgery at that time.    She said most of the time she feels reasonably well.  She knows the triggers that can precipitate her POTS.  Dehydration lack of sleep marked stress.  If she has a good diet and hydrates herself well stays active she usually feels pretty well.  She has not had a syncopal episode for over a year.  Blood pressures at home usually 120-130 mm Hg.  She does note her heart racing on occasion.  She usually just takes the Inderal once a day and then as needed as she feels she might need it.               Review of Systems: Unremarkable except as noted above    Meds     Current Outpatient Medications   Medication Instructions    ascorbic acid (Vitamin C) 1,000 mg tablet 3 tablets, oral, 3 times daily    cholecalciferol (Vitamin D-3) 5,000 Units tablet 1 tablet, oral, Daily    DULoxetine (CYMBALTA) 30 mg, oral, Daily    magnesium glycinate 800 mg, oral, Nightly    propranolol (INDERAL) 10 mg, oral, 2 times daily    vit B12/intrinsic fact/folate (INTRINSI B12-FOLATE ORAL) 1 tablet, oral, Daily        Allergies     Allergies   Allergen Reactions    Ciprofloxacin Other     connective tissue break down    toxic   toxic    toxic    Codeine Hives    Cyclobenzaprine Other     Excessive sedation for 3 days after 1 dose    Gabapentin Other     Excessive sedation    Other Nausea/vomiting     Narcotics    Sulfa (Sulfonamide Antibiotics) Nausea/vomiting    Latex Hives and Rash         Annotated Problems     Specialty Problems          Cardiology Problems    Postural orthostatic tachycardia syndrome    Atrial septal aneurysm    Hyperlipidemia, mixed    Aneurysm of thoracic aorta (CMS/HCC)    Irregular heartbeat    PFO (patent foramen ovale)    Tachycardia        Problem  "List     Patient Active Problem List    Diagnosis Date Noted    Aneurysm of thoracic aorta (CMS/HCC) 10/31/2023    Anxiety and depression 10/31/2023    Arthralgia of multiple joints 10/31/2023    Autonomic nervous system disorder 10/31/2023    Cervical radiculopathy 10/31/2023    Fatigue 10/31/2023    Idiopathic peripheral neuropathy 10/31/2023    Insomnia 10/31/2023    Irregular heartbeat 10/31/2023    Night sweats 10/31/2023    Numbness 10/31/2023    PFO (patent foramen ovale) 10/31/2023    Post-op pain 10/31/2023    Tachycardia 10/31/2023    Tingling 10/31/2023    Vitamin D deficiency 10/31/2023    Class 1 obesity with body mass index (BMI) of 30.0 to 30.9 in adult 10/31/2023    Hyperlipidemia, mixed 05/12/2022    Transaminitis 05/12/2022    Equinus deformity of both feet 04/05/2022    Strain of ankle and foot, initial encounter 02/17/2022    Atrial septal aneurysm 07/15/2020    Fibromyalgia 07/15/2020    Postural orthostatic tachycardia syndrome 03/10/2016    Syncope and collapse 03/10/2016    Dane-Danlos, 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    Chronic pain 07/07/2014    Sleep disorder 07/07/2014    TMJ (temporomandibular joint disorder) 07/07/2014    Dane-Danlos syndrome 10/10/1997       Objective:     Vitals:    12/12/23 1029 12/12/23 1038   BP: (!) 159/104 (!) 143/102   BP Location: Right arm Right arm   Patient Position: Sitting Sitting   Pulse: 69 68   Weight: 78.3 kg (172 lb 9.6 oz)    Height: 1.626 m (5' 4\")       Wt Readings from Last 4 Encounters:   12/12/23 78.3 kg (172 lb 9.6 oz)   11/01/23 77.6 kg (171 lb)   06/15/23 80.7 kg (178 lb)   12/15/22 78.5 kg (173 lb)           LAB:     Lab Results   Component Value Date    WBC 10.2 08/25/2023    HGB 12.7 08/25/2023    HCT 38.7 08/25/2023     08/25/2023    CHOL 229 (H) 07/11/2019    TRIG 82 07/11/2019    HDL 66.4 07/11/2019    ALT 11 08/25/2023    AST 21 08/25/2023     08/25/2023    K 4.0 08/25/2023     08/25/2023    CREATININE 0.74 " 08/25/2023    BUN 14 08/25/2023    CO2 27 08/25/2023    HGBA1C 5.3 06/14/2021       Diagnostic Studies:     No results found.      Radiology:     No orders to display       Physical Exam     General Appearance: alert and oriented to person, place and time, in no acute distress  Cardiovascular: normal rate, regular rhythm, normal S1 and S2, no murmurs, rubs, clicks, or gallops,  no JVD  Pulmonary/Chest: clear to auscultation bilaterally- no wheezes, rales or rhonchi, normal air movement, no respiratory distress  Abdomen: soft, non-tender, non-distended, normal bowel sounds, no masses   Extremities: no cyanosis, clubbing or edema  Skin: warm and dry, no rash or erythema  Eyes: EOMI  Neck: supple and non-tender without mass, no thyromegaly   Neurological: alert, oriented, normal speech, no focal findings or movement disorder noted  Vascular: No bruits

## 2023-12-13 PROBLEM — I71.20 ANEURYSM OF THORACIC AORTA (CMS-HCC): Status: RESOLVED | Noted: 2023-10-31 | Resolved: 2023-12-13

## 2023-12-13 PROBLEM — R00.0 TACHYCARDIA: Status: RESOLVED | Noted: 2023-10-31 | Resolved: 2023-12-13

## 2024-03-12 ENCOUNTER — OFFICE VISIT (OUTPATIENT)
Dept: CARDIOLOGY | Facility: CLINIC | Age: 49
End: 2024-03-12
Payer: MEDICARE

## 2024-03-12 VITALS
HEART RATE: 68 BPM | WEIGHT: 164.8 LBS | DIASTOLIC BLOOD PRESSURE: 78 MMHG | HEIGHT: 64 IN | SYSTOLIC BLOOD PRESSURE: 118 MMHG | BODY MASS INDEX: 28.13 KG/M2

## 2024-03-12 DIAGNOSIS — G90.A POSTURAL ORTHOSTATIC TACHYCARDIA SYNDROME: Primary | ICD-10-CM

## 2024-03-12 DIAGNOSIS — E78.2 HYPERLIPIDEMIA, MIXED: ICD-10-CM

## 2024-03-12 DIAGNOSIS — R00.2 PALPITATIONS: ICD-10-CM

## 2024-03-12 PROCEDURE — 1036F TOBACCO NON-USER: CPT | Performed by: INTERNAL MEDICINE

## 2024-03-12 PROCEDURE — 3078F DIAST BP <80 MM HG: CPT | Performed by: INTERNAL MEDICINE

## 2024-03-12 PROCEDURE — 99214 OFFICE O/P EST MOD 30 MIN: CPT | Performed by: INTERNAL MEDICINE

## 2024-03-12 PROCEDURE — 3074F SYST BP LT 130 MM HG: CPT | Performed by: INTERNAL MEDICINE

## 2024-03-12 RX ORDER — ROSUVASTATIN CALCIUM 10 MG/1
10 TABLET, COATED ORAL DAILY
Qty: 100 TABLET | Refills: 1 | Status: SHIPPED | OUTPATIENT
Start: 2024-03-12 | End: 2024-09-28

## 2024-03-12 NOTE — PATIENT INSTRUCTIONS
Fasting labs to be done in 4 months    Follow up in 6 month with Dr. Cali    Start Rosuvastatin 10 mg at beditme    -Please bring all medicines, vitamins, and herbal supplements with you in original bottles to every appointment!!!!    -Prescriptions will not be filled unless you are compliant with your follow up appointments or have a follow up appointment scheduled as per instruction of your physician. Refills should be requested at the time of your visit.

## 2024-03-12 NOTE — PROGRESS NOTES
Patient:  Demi Cao  YOB: 1975  MRN: [id number]       Impression/Plan:     Diagnoses and all orders for this visit:  Postural orthostatic tachycardia syndrome  Palpitations  -     With regular low-dose Inderal her symptoms are markedly improved and she feels well.  She said for the first week or 2 she felt tired but that has gone away.  Therefore would recommend continuing this dose maintaining hydration regular exercise which she has been doing.    Hyperlipidemia, mixed  -     rosuvastatin (Crestor) 10 mg tablet; Take 1 tablet (10 mg) by mouth once daily.  -     She notes that her calcium score was 0 previously but this does not exclude plaque deposition because of her age.  I am very concerned not just that her LDL is 140 but also her HDL is less than 50.  That being the case I think she would be best served in the future to have tighter control of her cholesterol and hence the addition of rosuvastatin.  She will monitor her general aches and pains that she has to see if any worsening happens with the rosuvastatin I told her if it seems to be the case that her musculoskeletal symptoms worsen she should stop the rosuvastatin wait for couple weeks and then resume it to be sure that it is the drug that is contributing.  Overall risk profile for rosuvastatin given the long-term benefit would suggest important of statin therapy at this time      Chief Complaint/Active Symptoms:       Demi Cao is a 48 y.o. female who presents with hypermobile EDS ( no test allows confirmation usual symptoms joint hypermobility, easily dislocatable joints, tiredness, constipation, POTS, urinary incontinence, she has many of these) confirmed by genetic evaluation in [facility].  She has significant POTS.      She has no history of coronary disease with calcium score of 0.  She is known to have a trivial PFO and interatrial aneurysm.  Echo showed no significant valvular heart disease.  She  has had no evidence of aortic pathology chest CT with contrast February 2020 normal aorta.  Echocardiogram 12/8/2022 normal LV/RV/RVSP.  Tiny PFO.  Normal valve structure and function no evidence aortic root dilatation or MVP.  She is a former critical care nurse     I last saw her 12/12/2023 which time she had some orthostatic tachycardia hypertension and palpitations.  He is encouraged to maintain hydration and to take her Inderal on a regular basis.  To allow steady state.  She was very sensitive to very low-dose in the past.    Lab work: 2/13/2024 cholesterol 204 triglyceride 89 HDL 46  CMP normal except borderline protein at 6.0 CRP and sed rate are normal    She denies angina, dyspnea, palpitation, edema, lightheadedness or syncope.  She has had no symptoms of claudication or neurologic deterioration.  There have been no hospitalizations or emergency room visits since last office visit.'    She notes with taking Inderal on a regular basis it has markedly blunted her orthostatic symptoms and she really notices.  She stays well-hydrated.  Only occasionally does she feel her heart skip.  She feels overall well.  She does have significant musculoskeletal aches and pains but they are about at baseline.    Importantly she has a strong family history of coronary disease although many of the family members smoke cigarettes as well some female members with coronary disease in her 50s.    She says her diet is very healthy she might have red meat once or twice a week.  She does not eat fast food.               Review of Systems: Unremarkable except as noted above    Meds     Current Outpatient Medications   Medication Instructions    ascorbic acid (Vitamin C) 1,000 mg tablet 3 tablets, oral, 3 times daily    cholecalciferol (Vitamin D-3) 5,000 Units tablet 1 tablet, oral, Daily    DULoxetine (CYMBALTA) 30 mg, oral, Daily    magnesium glycinate 800 mg, oral, Nightly    propranolol (INDERAL) 10 mg, oral, 2 times  daily    vit B12/intrinsic fact/folate (INTRINSI B12-FOLATE ORAL) 1 tablet, oral, Daily        Allergies     Allergies   Allergen Reactions    Ciprofloxacin Other     connective tissue break down    toxic   toxic    toxic    Codeine Hives    Cyclobenzaprine Other     Excessive sedation for 3 days after 1 dose    Gabapentin Other     Excessive sedation    Other Nausea/vomiting     Narcotics    Sulfa (Sulfonamide Antibiotics) Nausea/vomiting    Latex Hives and Rash         Annotated Problems     Specialty Problems          Cardiology Problems    Postural orthostatic tachycardia syndrome    Atrial septal aneurysm    Hyperlipidemia, mixed    PFO (patent foramen ovale)    Palpitations    Primary hypertension        Problem List     Patient Active Problem List    Diagnosis Date Noted    Primary hypertension 12/12/2023    Palpitations 12/12/2023    Anxiety and depression 10/31/2023    Arthralgia of multiple joints 10/31/2023    Autonomic nervous system disorder 10/31/2023    Cervical radiculopathy 10/31/2023    Fatigue 10/31/2023    Idiopathic peripheral neuropathy 10/31/2023    Insomnia 10/31/2023    Night sweats 10/31/2023    Numbness 10/31/2023    PFO (patent foramen ovale) 10/31/2023    Post-op pain 10/31/2023    Tingling 10/31/2023    Vitamin D deficiency 10/31/2023    Class 1 obesity with body mass index (BMI) of 30.0 to 30.9 in adult 10/31/2023    Hyperlipidemia, mixed 05/12/2022    Transaminitis 05/12/2022    Equinus deformity of both feet 04/05/2022    Strain of ankle and foot, initial encounter 02/17/2022    Atrial septal aneurysm 07/15/2020    Fibromyalgia 07/15/2020    Postural orthostatic tachycardia syndrome 03/10/2016    Syncope and collapse 03/10/2016    Dane-Danlos, 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    Chronic pain 07/07/2014    Sleep disorder 07/07/2014    TMJ (temporomandibular joint disorder) 07/07/2014       Objective:     Vitals:    03/12/24 0839   BP: 118/78   BP Location: Left arm   Patient Position:  "Sitting   Pulse: 68   Weight: 74.8 kg (164 lb 12.8 oz)   Height: 1.626 m (5' 4\")      Wt Readings from Last 4 Encounters:   03/12/24 74.8 kg (164 lb 12.8 oz)   12/12/23 78.3 kg (172 lb 9.6 oz)   11/01/23 77.6 kg (171 lb)   06/15/23 80.7 kg (178 lb)           LAB:     Lab Results   Component Value Date    WBC 10.2 08/25/2023    HGB 12.7 08/25/2023    HCT 38.7 08/25/2023     08/25/2023    CHOL 229 (H) 07/11/2019    TRIG 82 07/11/2019    HDL 66.4 07/11/2019    ALT 11 08/25/2023    AST 21 08/25/2023     08/25/2023    K 4.0 08/25/2023     08/25/2023    CREATININE 0.74 08/25/2023    BUN 14 08/25/2023    CO2 27 08/25/2023    HGBA1C 5.3 06/14/2021       Diagnostic Studies:     No results found.      Radiology:     No orders to display       Physical Exam     General Appearance: alert and oriented to person, place and time, in no acute distress  Cardiovascular: normal rate, regular rhythm, normal S1 and S2, no murmurs, rubs, clicks, or gallops,  no JVD  Pulmonary/Chest: clear to auscultation bilaterally- no wheezes, rales or rhonchi, normal air movement, no respiratory distress  Abdomen: soft, non-tender, non-distended, normal bowel sounds, no masses   Extremities: no cyanosis, clubbing or edema  Skin: warm and dry, no rash or erythema  Eyes: EOMI  Neck: supple and non-tender without mass, no thyromegaly   Neurological: alert, oriented, normal speech, no focal findings or movement disorder noted               Scribe Attestation  By signing my name below, IStephy LPN , Scribe   attest that this documentation has been prepared under the direction and in the presence of Gael Cali MD.        "

## 2024-09-10 ENCOUNTER — APPOINTMENT (OUTPATIENT)
Dept: CARDIOLOGY | Facility: CLINIC | Age: 49
End: 2024-09-10
Payer: MEDICARE

## 2024-09-10 VITALS
HEART RATE: 62 BPM | HEIGHT: 64 IN | WEIGHT: 146.8 LBS | DIASTOLIC BLOOD PRESSURE: 80 MMHG | SYSTOLIC BLOOD PRESSURE: 136 MMHG | BODY MASS INDEX: 25.06 KG/M2

## 2024-09-10 DIAGNOSIS — G90.A POSTURAL ORTHOSTATIC TACHYCARDIA SYNDROME: ICD-10-CM

## 2024-09-10 DIAGNOSIS — E78.2 HYPERLIPIDEMIA, MIXED: ICD-10-CM

## 2024-09-10 PROCEDURE — G2211 COMPLEX E/M VISIT ADD ON: HCPCS | Performed by: INTERNAL MEDICINE

## 2024-09-10 PROCEDURE — 3075F SYST BP GE 130 - 139MM HG: CPT | Performed by: INTERNAL MEDICINE

## 2024-09-10 PROCEDURE — 99214 OFFICE O/P EST MOD 30 MIN: CPT | Performed by: INTERNAL MEDICINE

## 2024-09-10 PROCEDURE — 1036F TOBACCO NON-USER: CPT | Performed by: INTERNAL MEDICINE

## 2024-09-10 PROCEDURE — 3079F DIAST BP 80-89 MM HG: CPT | Performed by: INTERNAL MEDICINE

## 2024-09-10 PROCEDURE — 3008F BODY MASS INDEX DOCD: CPT | Performed by: INTERNAL MEDICINE

## 2024-09-10 RX ORDER — PROPRANOLOL HYDROCHLORIDE 10 MG/1
10 TABLET ORAL 2 TIMES DAILY
Qty: 180 TABLET | Refills: 3 | Status: SHIPPED | OUTPATIENT
Start: 2024-09-10

## 2024-09-10 NOTE — PATIENT INSTRUCTIONS
ASK ABOUT PRAULENT AND REPATHA   FASTING LABS IN 3-4 MONTHS     Exercise diet weight loss program.     Stay plenty HYDRATED     Use My Chart portal for reviewing records, testing and contacting office.      Please bring all medicines, vitamins, and herbal supplements with you in original bottles to every appointment!!!!    Prescriptions will not be filled unless you are compliant with your follow up appointments or have a follow up appointment scheduled as per instruction of your physician. Refills should be requested at the time of your visit.

## 2024-09-10 NOTE — PROGRESS NOTES
Patient:  Demi Cao  YOB: 1975  MRN: [id number]       Impression/Plan:     Diagnoses and all orders for this visit:  Hyperlipidemia, mixed  -    She has a very strong inflammatory disease from a rheumatologic standpoint which also increases her risk of vascular disease  -    She cannot tolerate statins.  She has had a history of increased uric acid in the past and I be somewhat reluctant to utilize bempedoic acid.  She has no symptoms or signs of vascular disease and I am not sure her insurance would cover PCSK9 inhibitor but she will explore that possibility.  -    I did review with her that if rheumatology feels inflammatory markers can be decreased with one of the immunologic agents it would be worth considering because she is at risk for not only of her inflammatory process but also because her HDL for a woman is relatively low with a high LDL.  Postural orthostatic tachycardia syndrome  -     Quite manageable when she has exercise hydrates and takes the Inderal just twice a day.  Continues  -     propranolol (Inderal) 10 mg tablet; Take 1 tablet (10 mg) by mouth 2 times a day.  -     Follow Up In Cardiology; Future      Chief Complaint/Active Symptoms:       Demi Cao is a 48 y.o. female who presents with hypermobile EDS ( no test allows confirmation usual symptoms joint hypermobility, easily dislocatable joints, tiredness, constipation, POTS, urinary incontinence, she has many of these) confirmed by genetic evaluation in [facility].  She has significant POTS.       She has no history of coronary disease with calcium score of 0.  She is known to have a trivial PFO and interatrial aneurysm.  Echo showed no significant valvular heart disease.  She has had no evidence of aortic pathology chest CT with contrast February 2020 normal aorta.  Echocardiogram 12/8/2022 normal LV/RV/RVSP.  Tiny PFO.  Normal valve structure and function no evidence aortic root dilatation or  Previous scripts defaulted to \"normal\" status due to no  Pharmacy selected;  not sent to any Pharmacy.    Prescriptions reloaded with appropriate Pharmacy and routed to Provider.   HAYDEE.  She is a former critical care nurse .      3/12/2024 was here in the office and using low-dose Inderal regularly improved her symptoms she was feeling well she is encouraged to continue hydration.  Simvastatin added at that time because of an LDL of 140 but also HDL less than 50.    She denies angina, dyspnea, palpitation, edema, lightheadedness or syncope.  She has had no symptoms of claudication or neurologic deterioration.  There have been no hospitalizations or emergency room visits since last office visit.    She absolutely cannot tolerate rosuvastatin in fact the pain was so severe she was in a ball on the floor.  She gave a second trial and the same thing happened.  Her inflammatory arthritis continues to be very problematic.  Spring and fall almost always caused her to have flares.  As to her POTS the current dose of propranolol twice a day has markedly limited her symptoms.  Sometimes she gets slightly lightheaded.  When her arthritis flares she feels worse because she cannot exercise as much and the exercise helps the POTS as well.  Rheumatologist is considering an anti-inflammatory and she is concerned if she should start it now.               Review of Systems: Unremarkable except as noted above    Meds     Current Outpatient Medications   Medication Instructions    ascorbic acid (Vitamin C) 1,000 mg tablet 3 tablets, oral, 3 times daily    cholecalciferol (Vitamin D-3) 5,000 Units tablet 1 tablet, oral, Daily    DULoxetine (CYMBALTA) 30 mg, oral, Daily    magnesium glycinate 700 mg, oral, Nightly    propranolol (INDERAL) 10 mg, oral, 2 times daily    vit B12/intrinsic fact/folate (INTRINSI B12-FOLATE ORAL) 1 tablet, oral, Daily        Allergies     Allergies   Allergen Reactions    Statins-Hmg-Coa Reductase Inhibitors Myalgia    Ciprofloxacin Other     connective tissue break down    toxic   toxic    toxic    Codeine Hives    Cyclobenzaprine Other     Excessive sedation for 3 days after 1 dose     "Gabapentin Other     Excessive sedation    Other Nausea/vomiting     Narcotics    Rosuvastatin Myalgia     Very severe myalgia that recurred on rechallenge    Sulfa (Sulfonamide Antibiotics) Nausea/vomiting    Latex Hives and Rash         Annotated Problems     Specialty Problems          Cardiology Problems    Postural orthostatic tachycardia syndrome    Atrial septal aneurysm    Hyperlipidemia, mixed    PFO (patent foramen ovale) ([facility])    Palpitations    Primary hypertension        Problem List     Patient Active Problem List    Diagnosis Date Noted    Primary hypertension 12/12/2023    Palpitations 12/12/2023    Anxiety and depression 10/31/2023    Arthralgia of multiple joints 10/31/2023    Autonomic nervous system disorder 10/31/2023    Cervical radiculopathy 10/31/2023    Fatigue 10/31/2023    Idiopathic peripheral neuropathy 10/31/2023    Insomnia 10/31/2023    Night sweats 10/31/2023    Numbness 10/31/2023    PFO (patent foramen ovale) ([facility]) 10/31/2023    Post-op pain 10/31/2023    Tingling 10/31/2023    Vitamin D deficiency 10/31/2023    Class 1 obesity with body mass index (BMI) of 30.0 to 30.9 in adult 10/31/2023    Hyperlipidemia, mixed 05/12/2022    Transaminitis 05/12/2022    Equinus deformity of both feet 04/05/2022    Strain of ankle and foot, initial encounter 02/17/2022    Atrial septal aneurysm 07/15/2020    Fibromyalgia 07/15/2020    Postural orthostatic tachycardia syndrome 03/10/2016    Syncope and collapse 03/10/2016    Dane-Danlos, 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    Chronic pain 07/07/2014    Sleep disorder 07/07/2014    TMJ (temporomandibular joint disorder) 07/07/2014       Objective:     Vitals:    09/10/24 0856   BP: 136/80   BP Location: Left arm   Patient Position: Sitting   Pulse: 62   Weight: 66.6 kg (146 lb 12.8 oz)   Height: 1.626 m (5' 4\")      Wt Readings from Last 4 Encounters:   09/10/24 66.6 kg (146 lb 12.8 oz)   03/12/24 74.8 kg (164 lb 12.8 oz)   12/12/23 78.3 " kg (172 lb 9.6 oz)   11/01/23 77.6 kg (171 lb)           LAB:     Lab Results   Component Value Date    WBC 10.2 08/25/2023    HGB 12.7 08/25/2023    HCT 38.7 08/25/2023     08/25/2023    CHOL 229 (H) 07/11/2019    TRIG 82 07/11/2019    HDL 66.4 07/11/2019    ALT 11 08/25/2023    AST 21 08/25/2023     08/25/2023    K 4.0 08/25/2023     08/25/2023    CREATININE 0.74 08/25/2023    BUN 14 08/25/2023    CO2 27 08/25/2023    HGBA1C 5.3 06/14/2021       Diagnostic Studies:     No results found.      Radiology:     No orders to display       Physical Exam     General Appearance: alert and oriented to person, place and time, in no acute distress  Cardiovascular: normal rate, regular rhythm, normal S1 and S2, no murmurs, rubs, clicks, or gallops,  no JVD  Pulmonary/Chest: clear to auscultation bilaterally- no wheezes, rales or rhonchi, normal air movement, no respiratory distress  Abdomen: soft, non-tender, non-distended, normal bowel sounds, no masses   Extremities: no cyanosis, clubbing or edema  Skin: warm and dry, no rash or erythema  Eyes: EOMI  Neck: supple and non-tender without mass, no thyromegaly   Neurological: alert, oriented, normal speech, no focal findings or movement disorder noted  Vascular: Pulses 2+      Scribe Attestation  By signing my name below, I, Gael Cali MD, Scribe   attest that this documentation has been prepared under the direction and in the presence of Gael Cali MD.

## 2024-12-27 ENCOUNTER — OFFICE VISIT (OUTPATIENT)
Dept: URGENT CARE | Age: 49
End: 2024-12-27
Payer: MEDICARE

## 2024-12-27 VITALS
TEMPERATURE: 98.5 F | RESPIRATION RATE: 16 BRPM | DIASTOLIC BLOOD PRESSURE: 88 MMHG | WEIGHT: 145 LBS | HEIGHT: 64 IN | BODY MASS INDEX: 24.75 KG/M2 | SYSTOLIC BLOOD PRESSURE: 143 MMHG | HEART RATE: 63 BPM | OXYGEN SATURATION: 97 %

## 2024-12-27 DIAGNOSIS — B37.2 CANDIDIASIS OF SKIN: Primary | ICD-10-CM

## 2024-12-27 RX ORDER — CLOTRIMAZOLE AND BETAMETHASONE DIPROPIONATE 10; .64 MG/G; MG/G
1 CREAM TOPICAL 2 TIMES DAILY
Qty: 45 G | Refills: 0 | Status: SHIPPED | OUTPATIENT
Start: 2024-12-27 | End: 2025-01-24

## 2024-12-27 RX ORDER — FLUCONAZOLE 150 MG/1
150 TABLET ORAL SEE ADMIN INSTRUCTIONS
Qty: 2 TABLET | Refills: 0 | Status: SHIPPED | OUTPATIENT
Start: 2024-12-27 | End: 2024-12-28

## 2024-12-27 ASSESSMENT — ENCOUNTER SYMPTOMS
PSYCHIATRIC NEGATIVE: 1
MUSCULOSKELETAL NEGATIVE: 1
PALPITATIONS: 0
EYES NEGATIVE: 1
GASTROINTESTINAL NEGATIVE: 1
HEMATOLOGIC/LYMPHATIC NEGATIVE: 1
ENDOCRINE NEGATIVE: 1
CONSTITUTIONAL NEGATIVE: 1
ALLERGIC/IMMUNOLOGIC NEGATIVE: 1
NEUROLOGICAL NEGATIVE: 1
RESPIRATORY NEGATIVE: 1

## 2024-12-27 ASSESSMENT — PAIN SCALES - GENERAL: PAINLEVEL_OUTOF10: 0-NO PAIN

## 2024-12-27 NOTE — PROGRESS NOTES
Subjective   Patient ID: Demi Cao is a 49 y.o. female. They present today with a chief complaint of Rash (Rash under left axillary x 10 days ).    History of Present Illness    Rash        Pt presents to urgent care with c/o  rash under L armpit x 10 days.  Pt states she initially thought it was caused by razor burn but it has been getting progressively worse.  She has been using coconut oil with no relief.  Denies drainage.  States it is itchy and burns .  Pt denies CP, SOB, palpitations, fevers, abd pain, n/v/d, sick contacts, recent travel.        Past Medical History  Allergies as of 12/27/2024 - Reviewed 12/27/2024   Allergen Reaction Noted    Statins-hmg-coa reductase inhibitors Myalgia 09/10/2024    Ciprofloxacin Other 03/10/2016    Codeine Hives 03/10/2016    Cyclobenzaprine Other 07/15/2020    Gabapentin Other 07/15/2020    Other Nausea/vomiting 07/07/2014    Rosuvastatin Myalgia 04/23/2024    Sulfa (sulfonamide antibiotics) Nausea/vomiting 02/06/2020    Latex Hives and Rash 07/07/2014       (Not in a hospital admission)       Past Medical History:   Diagnosis Date    Aneurysm of heart 12/15/2022    Atrial septal aneurysm    Dane-Danlos syndrome, unspecified ([facility]) 04/07/2014    Dane-Danlos syndrome    Long term (current) use of systemic steroids 06/27/2019    Long term (current) use of systemic steroids    Myalgia, other site 06/27/2019    Myofascial pain syndrome    Other long term (current) drug therapy 06/27/2019    High risk medication use    Pain in right toe(s) 06/27/2019    Pain and swelling of toe of right foot    Personal history of other diseases of the female genital tract     History of abnormal cervical Papanicolaou smear    Personal history of other diseases of the musculoskeletal system and connective tissue 06/27/2019    History of low back pain    Personal history of other diseases of the musculoskeletal system and connective tissue 06/27/2019    History of joint pain     "Personal history of other diseases of the nervous system and sense organs     History of peripheral neuropathy    Personal history of other specified conditions 09/19/2017    History of facial pain       Past Surgical History:   Procedure Laterality Date    HYSTERECTOMY  08/21/2013    Hysterectomy    OTHER SURGICAL HISTORY  08/21/2013    Biopsy Of Liver    OTHER SURGICAL HISTORY  02/22/2022    Skin biopsy    OTHER SURGICAL HISTORY  06/27/2019    Tonsillectomy    OTHER SURGICAL HISTORY  06/27/2019    Uterine nerve ablation    OTHER SURGICAL HISTORY  01/13/2022    Cyst excision    OTHER SURGICAL HISTORY  01/13/2022    Tonsillectomy with adenoidectomy    OTHER SURGICAL HISTORY  01/13/2022    Shoulder surgery        reports that she has quit smoking. Her smoking use included cigarettes. She has never used smokeless tobacco. She reports current alcohol use. She reports that she does not use drugs.    Review of Systems  Review of Systems   Constitutional: Negative.    HENT: Negative.     Eyes: Negative.    Respiratory: Negative.     Cardiovascular:  Negative for chest pain and palpitations.   Gastrointestinal: Negative.    Endocrine: Negative.    Genitourinary: Negative.    Musculoskeletal: Negative.    Skin:  Positive for rash.   Allergic/Immunologic: Negative.    Neurological: Negative.    Hematological: Negative.    Psychiatric/Behavioral: Negative.     All other systems reviewed and are negative.                                 Objective    Vitals:    12/27/24 1049   BP: 143/88   BP Location: Left arm   Patient Position: Sitting   Pulse: 63   Resp: 16   Temp: 36.9 °C (98.5 °F)   TempSrc: Oral   SpO2: 97%   Weight: 65.8 kg (145 lb)   Height: 1.626 m (5' 4\")     No LMP recorded.    Physical Exam  Vitals and nursing note reviewed.   Constitutional:       General: She is not in acute distress.     Appearance: Normal appearance. She is not ill-appearing or toxic-appearing.   Cardiovascular:      Rate and Rhythm: Normal " rate.   Musculoskeletal:         General: Normal range of motion.   Skin:     General: Skin is warm and dry.      Capillary Refill: Capillary refill takes less than 2 seconds.      Findings: Rash present.   Neurological:      General: No focal deficit present.      Mental Status: She is alert and oriented to person, place, and time.   Psychiatric:         Mood and Affect: Mood normal.         Behavior: Behavior normal.         Thought Content: Thought content normal.         Procedures    Point of Care Test & Imaging Results from this visit  No results found for this visit on 12/27/24.   No results found.    Diagnostic study results (if any) were reviewed by EDDY Martinez.    Assessment/Plan   Allergies, medications, history, and pertinent labs/EKGs/Imaging reviewed by EDDY Martinez.     Medical Decision Making   Patient with rash under left axilla x 2 days.  Rash appears clinically consistent with fungal yeast rash.  Will give prescription oral Diflucan and topical Lotrisone cream.At time of discharge patient was clinically well-appearing and HDS for outpatient management. The patient was educated regarding diagnosis, supportive care, OTC and Rx medications. The patient was given the opportunity to ask questions prior to discharge.  They verbalized understanding of my discussion of the plans for treatment, expected course, indications to return to  or seek further evaluation in ED, and the need for timely follow up as directed.   They were provided with a work/school excuse if requested.       Orders and Diagnoses  Diagnoses and all orders for this visit:  Candidiasis of skin  -     fluconazole (Diflucan) 150 mg tablet; Take 1 tablet (150 mg) by mouth see administration instructions for 1 day. Take one tab now. Repeat in 7 days if symptoms persist.  -     clotrimazole-betamethasone (Lotrisone) cream; Apply 1 Application topically 2 times a day for 28 days.      Medical Admin  Record      Patient disposition: Home    Electronically signed by EDDY Martinez  8:03 AM

## 2025-03-11 ENCOUNTER — APPOINTMENT (OUTPATIENT)
Dept: CARDIOLOGY | Facility: CLINIC | Age: 50
End: 2025-03-11
Payer: MEDICARE

## 2025-04-21 ENCOUNTER — APPOINTMENT (OUTPATIENT)
Dept: CARDIOLOGY | Facility: CLINIC | Age: 50
End: 2025-04-21
Payer: MEDICARE

## 2025-04-21 VITALS
WEIGHT: 149.2 LBS | SYSTOLIC BLOOD PRESSURE: 140 MMHG | HEART RATE: 56 BPM | BODY MASS INDEX: 25.47 KG/M2 | DIASTOLIC BLOOD PRESSURE: 74 MMHG | HEIGHT: 64 IN

## 2025-04-21 DIAGNOSIS — G90.A POSTURAL ORTHOSTATIC TACHYCARDIA SYNDROME: ICD-10-CM

## 2025-04-21 PROCEDURE — 3077F SYST BP >= 140 MM HG: CPT | Performed by: INTERNAL MEDICINE

## 2025-04-21 PROCEDURE — 3008F BODY MASS INDEX DOCD: CPT | Performed by: INTERNAL MEDICINE

## 2025-04-21 PROCEDURE — 1036F TOBACCO NON-USER: CPT | Performed by: INTERNAL MEDICINE

## 2025-04-21 PROCEDURE — 99214 OFFICE O/P EST MOD 30 MIN: CPT | Performed by: INTERNAL MEDICINE

## 2025-04-21 PROCEDURE — G2211 COMPLEX E/M VISIT ADD ON: HCPCS | Performed by: INTERNAL MEDICINE

## 2025-04-21 PROCEDURE — 3078F DIAST BP <80 MM HG: CPT | Performed by: INTERNAL MEDICINE

## 2025-04-21 RX ORDER — POTASSIUM CHLORIDE 20 MEQ/1
20 TABLET, EXTENDED RELEASE ORAL DAILY
Qty: 90 TABLET | Refills: 3 | Status: SHIPPED | OUTPATIENT
Start: 2025-04-21 | End: 2026-04-21

## 2025-04-21 RX ORDER — PROPRANOLOL HYDROCHLORIDE 10 MG/1
10 TABLET ORAL 2 TIMES DAILY
Qty: 180 TABLET | Refills: 3 | Status: SHIPPED | OUTPATIENT
Start: 2025-04-21

## 2025-04-21 NOTE — PROGRESS NOTES
Patient:  Demi Cao  YOB: 1975  MRN: [id number]       Impression/Plan:     Diagnoses and all orders for this visit:  Postural orthostatic tachycardia syndrome  -     She is hydrating well and it is possible that is the reason the potassium is slightly low will supplement as described below.  -     I cannot explain why twice a year she has more trouble with POTS than usual.  She says she is actually quite comfortable with her current symptom complex and blood pressure variation on the twice daily 10 mg Inderal and would wish to continue that regimen.  -     propranolol (Inderal) 10 mg tablet; Take 1 tablet (10 mg) by mouth 2 times a day.  -     potassium chloride CR (Klor-Con M20) 20 mEq ER tablet; Take 1 tablet (20 mEq) by mouth once daily. Do not crush or chew.  -     Basic Metabolic Panel; Future      Chief Complaint/Active Symptoms:      Chief Complaint   Patient presents with    Follow-up    Hyperlipidemia       Demi Cao is a 49 y.o. 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  She has significant POTS.       She has no history of coronary disease with calcium score of 0.  She is known to have a trivial PFO and interatrial aneurysm.  Echo showed no significant valvular heart disease.  She has had no evidence of aortic pathology chest CT with contrast February 2020 normal aorta.  Echocardiogram 12/8/2022 normal LV/RV/RVSP.  Tiny PFO.  Normal valve structure and function no evidence aortic root dilatation or MVP.  She is a former critical care nurse .  .      Was seen here 9/10/2024 discussed hyperlipidemia unable to tolerate statins or bempedoic acid.  She was going to evaluate PCSK9 inhibitors as to insurance coverage.  Her POTS syndrome seemed  manageable when she hydrates herself.    3/3/2025 cholesterol 241 HDL 73   CMP unremarkable set potassium 3.6 lower normal 3.7  Hs-CRP normal  CBC normal    She denies angina, dyspnea, palpitation, edema, lightheadedness or syncope.  She has had no symptoms of claudication or neurologic deterioration.  There have been no hospitalizations or emergency room visits since last office visit.    Although currently she feels well and thinks that the Inderal at the current dose is quite beneficial she did have nearly a week where her blood pressure was remarkably labile as well as the heart rate.  Heart rate ranging from the 40s to occasionally the 120s.  When the heart rate was slow she would take a half of an Inderal.  Blood pressure is also sometimes quite high and sometimes rather low consistent with her POTS she is hydrating well.  She says this sensation occurs every year twice a year at spring and fall lasting 1 to 2 days.  She has no significant allergies.  She has not changed her diet or activity level.  And at this time is asymptomatic.               Review of Systems: Unremarkable except as noted above    Meds     Current Outpatient Medications   Medication Instructions    ascorbic acid (Vitamin C) 1,000 mg tablet 3 tablets, oral, Daily    cholecalciferol (Vitamin D-3) 5,000 Units tablet 1 tablet, Daily    DULoxetine (CYMBALTA) 30 mg, Daily    magnesium glycinate 700 mg, Nightly    propranolol (INDERAL) 10 mg, oral, 2 times daily    vit B12/intrinsic fact/folate (INTRINSI B12-FOLATE ORAL) 1 tablet, Daily        Allergies   Allergies[1]      Annotated Problems     Specialty Problems          Cardiology Problems    Postural orthostatic tachycardia syndrome    Atrial septal aneurysm    Hyperlipidemia, mixed    PFO (patent foramen ovale) ([facility]    Palpitations    Primary hypertension        Problem List     Patient Active Problem List    Diagnosis Date Noted    Primary hypertension 12/12/2023     "Palpitations 12/12/2023    Anxiety and depression 10/31/2023    Arthralgia of multiple joints 10/31/2023    Autonomic nervous system disorder 10/31/2023    Cervical radiculopathy 10/31/2023    Fatigue 10/31/2023    Idiopathic peripheral neuropathy 10/31/2023    Insomnia 10/31/2023    Night sweats 10/31/2023    Numbness 10/31/2023    PFO (patent foramen ovale) (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    Chronic pain 07/07/2014    Sleep disorder 07/07/2014    TMJ (temporomandibular joint disorder) 07/07/2014       Objective:     Vitals:    04/21/25 0803   BP: 140/74   BP Location: Left arm   Patient Position: Sitting   Pulse: 56   Weight: 67.7 kg (149 lb 3.2 oz)   Height: 1.626 m (5' 4\")      Wt Readings from Last 4 Encounters:   04/21/25 67.7 kg (149 lb 3.2 oz)   12/27/24 65.8 kg (145 lb)   09/10/24 66.6 kg (146 lb 12.8 oz)   03/12/24 74.8 kg (164 lb 12.8 oz)           LAB:     Lab Results   Component Value Date    WBC 10.2 08/25/2023    HGB 12.7 08/25/2023    HCT 38.7 08/25/2023     08/25/2023    CHOL 229 (H) 07/11/2019    TRIG 82 07/11/2019    HDL 66.4 07/11/2019    ALT 11 08/25/2023    AST 21 08/25/2023     08/25/2023    K 4.0 08/25/2023     08/25/2023    CREATININE 0.74 08/25/2023    BUN 14 08/25/2023    CO2 27 08/25/2023    HGBA1C 5.3 06/14/2021         Physical Exam     General Appearance: alert and oriented to person, place and time, in no acute distress  Cardiovascular: " normal rate, regular rhythm, normal S1 and S2, no murmurs, rubs, clicks, or gallops,  no JVD  Pulmonary/Chest: clear to auscultation bilaterally- no wheezes, rales or rhonchi, normal air movement, no respiratory distress  Abdomen: soft, non-tender, non-distended, normal bowel sounds, no masses   Extremities: no cyanosis, clubbing or edema  Skin: warm and dry, no rash or erythema  Eyes: EOMI  Neck: supple and non-tender without mass, no thyromegaly   Neurological: alert, oriented, normal speech, no focal findings or movement disorder noted  Vascular:       Provider attestation-scribe documentation  Any medical record entries made by the scribe were at my discretion and personally dictated by me.  I have reviewed the chart and agree that the record accurately reflects my personal performance of the history, physical exam, discussion and plan.      Scribe Attestation  By signing my name below, I, Kathi Duffy MA, Scribe   attest that this documentation has been prepared under the direction and in the presence of Gael Cali MD.            [1]   Allergies  Allergen Reactions    Statins-Hmg-Coa Reductase Inhibitors Myalgia    Ciprofloxacin Other     connective tissue break down    toxic   toxic    toxic    Codeine Hives    Cyclobenzaprine Other     Excessive sedation for 3 days after 1 dose    Gabapentin Other     Excessive sedation    Other Nausea/vomiting     Narcotics    Rosuvastatin Myalgia     Very severe myalgia that recurred on rechallenge    Sulfa (Sulfonamide Antibiotics) Nausea/vomiting    Latex Hives and Rash

## 2025-10-20 ENCOUNTER — APPOINTMENT (OUTPATIENT)
Dept: CARDIOLOGY | Facility: CLINIC | Age: 50
End: 2025-10-20
Payer: MEDICARE

## (undated) DEVICE — GOWN,AURORA,NONREINFORCED,LARGE: Brand: MEDLINE

## (undated) DEVICE — SUTURE ETHLN SZ 4-0 L18IN NONABSORBABLE BLK L19MM PS-2 3/8 1667H

## (undated) DEVICE — ELECTRODE PT RET AD L9FT HI MOIST COND ADH HYDRGEL CORDED

## (undated) DEVICE — BANDAGE,GAUZE,BULKEE II,4.5"X4.1YD,STRL: Brand: MEDLINE

## (undated) DEVICE — SPONGE GZ W4XL4IN COT 12 PLY TYP VII WVN C FLD DSGN

## (undated) DEVICE — PADDING UNDERCAST W4INXL12FT RAYON POLY SYN NONADHESIVE

## (undated) DEVICE — COVER LT HNDL BLU PLAS

## (undated) DEVICE — GLOVE ORANGE PI 7   MSG9070

## (undated) DEVICE — SYRINGE IRRIG 60ML SFT PLIABLE BLB EZ TO GRP 1 HND USE W/

## (undated) DEVICE — 1010 S-DRAPE TOWEL DRAPE 10/BX: Brand: STERI-DRAPE™

## (undated) DEVICE — NEPTUNE E-SEP SMOKE EVACUATION PENCIL, COATED, 70MM BLADE, PUSH BUTTON SWITCH: Brand: NEPTUNE E-SEP

## (undated) DEVICE — SUTURE VCRL SZ 4-0 L27IN ABSRB VLT L17MM RB-1 1/2 CIR J304H

## (undated) DEVICE — GLOVE ORANGE PI 7 1/2   MSG9075

## (undated) DEVICE — APPLICATOR MEDICATED 26 CC SOLUTION HI LT ORNG CHLORAPREP

## (undated) DEVICE — HAND II: Brand: MEDLINE INDUSTRIES, INC.

## (undated) DEVICE — WRAP COHESIVE W2INXL5YD TAN SELF ADH BNDG HND NON STERILE TEAR CARING

## (undated) DEVICE — DRESSING PETRO W3XL8IN OIL EMUL N ADH GZ KNIT IMPREG CELOS

## (undated) DEVICE — SINGLE PORT MANIFOLD: Brand: NEPTUNE 2